# Patient Record
Sex: FEMALE | Race: WHITE | Employment: FULL TIME | URBAN - METROPOLITAN AREA
[De-identification: names, ages, dates, MRNs, and addresses within clinical notes are randomized per-mention and may not be internally consistent; named-entity substitution may affect disease eponyms.]

---

## 2017-02-03 ENCOUNTER — OFFICE VISIT (OUTPATIENT)
Dept: FAMILY MEDICINE | Facility: CLINIC | Age: 37
End: 2017-02-03
Payer: COMMERCIAL

## 2017-02-03 ENCOUNTER — TELEPHONE (OUTPATIENT)
Dept: FAMILY MEDICINE | Facility: CLINIC | Age: 37
End: 2017-02-03

## 2017-02-03 VITALS
BODY MASS INDEX: 36.43 KG/M2 | HEART RATE: 78 BPM | WEIGHT: 246 LBS | OXYGEN SATURATION: 100 % | DIASTOLIC BLOOD PRESSURE: 84 MMHG | SYSTOLIC BLOOD PRESSURE: 124 MMHG | HEIGHT: 69 IN | TEMPERATURE: 97.6 F

## 2017-02-03 DIAGNOSIS — J04.0 LARYNGITIS: ICD-10-CM

## 2017-02-03 DIAGNOSIS — J20.9 ACUTE BRONCHITIS, UNSPECIFIED ORGANISM: Primary | ICD-10-CM

## 2017-02-03 PROCEDURE — 99213 OFFICE O/P EST LOW 20 MIN: CPT | Performed by: PHYSICIAN ASSISTANT

## 2017-02-03 RX ORDER — ALBUTEROL SULFATE 90 UG/1
2 AEROSOL, METERED RESPIRATORY (INHALATION) EVERY 6 HOURS PRN
Qty: 1 INHALER | Refills: 0 | Status: SHIPPED | OUTPATIENT
Start: 2017-02-03

## 2017-02-03 NOTE — NURSING NOTE
"Chief Complaint   Patient presents with     URI       Initial /84 mmHg  Pulse 78  Temp(Src) 97.6  F (36.4  C) (Tympanic)  Ht 5' 8.5\" (1.74 m)  Wt 246 lb (111.585 kg)  BMI 36.86 kg/m2  SpO2 100%  LMP 05/25/2016 (Approximate)  Breastfeeding? No Estimated body mass index is 36.86 kg/(m^2) as calculated from the following:    Height as of this encounter: 5' 8.5\" (1.74 m).    Weight as of this encounter: 246 lb (111.585 kg).  BP completed using cuff size: conrad Moore MA      "

## 2017-02-03 NOTE — TELEPHONE ENCOUNTER
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     Has cough, phlegm with streaks of blood, very dry home, small amount and thick, slight nasal drainage, feels wheezy    No shortness of breath or fever.    Has been keeping well hydrated, taking Tylenol for sore throat pain, Robitussin for cough, herbal tea     Does not feel that she is getting better and concerned since she is pregnant.      Given option of contacting PCP office or coming for OV here, opted for OV here for evaluation.     Scheduled for OV with Carlo at 11:00.    RAAD Roberson

## 2017-02-03 NOTE — Clinical Note
Please abstract the following data from this visit with this patient into the appropriate field in Epic:  Pap smear done on this date: 08/01/2016 (approximately), by this group: [facility], results were normal.

## 2017-02-03 NOTE — PROGRESS NOTES
SUBJECTIVE:                                                    Linette Paul is a 36 year old female who presents to clinic today for the following health issues:    Patient is 36 weeks pregnant, ROLANDO 3/1/17.     Acute Illness   Acute illness concerns: URI  Onset: 1 week     Fever: no    Chills/Sweats: no    Headache (location?): YES    Sinus Pressure:YES    Conjunctivitis:  YES: both    Ear Pain: YES: both    Rhinorrhea: no     Congestion: YES    Sore Throat: YES- was tested for strep 2 days ago at [facility] and came back negative     Cough: YES-productive of yellow sputum with blood streaks    Wheeze: YES    Decreased Appetite: YES    Nausea: no    Vomiting: no    Diarrhea:  no    Dysuria/Freq.: no    Fatigue/Achiness: YES    Sick/Strep Exposure: YES- her 6 year old step son is coughing.      Therapies Tried and outcome: tylenol and robitussin, drinking tea.     was seen 2 days ago at [facility] and she was told this was viral, but now she is experiencing voice hoarsness, sore throat and continued cough that is blood streaked.  No f/v, n/v/d        Problem list and histories reviewed & adjusted, as indicated.  Additional history: as documented    Patient Active Problem List   Diagnosis     Mild intermittent asthma     Left shoulder strain     CARDIOVASCULAR SCREENING; LDL GOAL LESS THAN 160     Normal labor     Active labor     Single liveborn infant delivered vaginally     Past Surgical History   Procedure Laterality Date     C oral surgery procedure       Dupuyer teeth surgery       Social History   Substance Use Topics     Smoking status: Never Smoker      Smokeless tobacco: Not on file     Alcohol Use: No     Family History   Problem Relation Age of Onset     C.A.D. Paternal Grandfather      Hypertension Brother      Hypertension Mother      Hypertension Father      Hypertension Paternal Grandmother      Hypertension Paternal Grandfather      CEREBROVASCULAR DISEASE Paternal Grandfather      Breast Cancer Maternal  "Aunt      Breast Cancer Maternal Aunt          Current Outpatient Prescriptions   Medication Sig Dispense Refill     albuterol (PROAIR HFA/PROVENTIL HFA/VENTOLIN HFA) 108 (90 BASE) MCG/ACT Inhaler Inhale 2 puffs into the lungs every 6 hours as needed for shortness of breath / dyspnea or wheezing 1 Inhaler 0     lidocaine (XYLOCAINE) 2 % solution Take 15 mLs by mouth every 6 hours as needed for moderate pain swish and spit every 3-8 hours as needed; max 8 doses/24 hour period 100 mL 0     Prenatal vitamin  s (DIS) TABS Take 1 tablet by mouth daily       Aspirin-Acetaminophen-Caffeine (EXCEDRIN PO)        acetaminophen (TYLENOL) 325 MG tablet Take 2 tablets (650 mg) by mouth every 4 hours as needed (temperature > 100.4 F) 100 tablet      ibuprofen (ADVIL,MOTRIN) 400-800 mg tablet Take 1-2 tablets (400-800 mg) by mouth every 6 hours as needed (cramping) 60 tablet 0     docusate sodium 100 MG tablet Take 100 mg by mouth daily as needed for constipation 60 tablet 1     Allergies   Allergen Reactions     Flagyl [Metronidazole]      No Known Drug Allergies      Seasonal Allergies        ROS:  Constitutional, HEENT, cardiovascular, pulmonary, gi and gu systems are negative, except as otherwise noted.    OBJECTIVE:                                                    /84 mmHg  Pulse 78  Temp(Src) 97.6  F (36.4  C) (Tympanic)  Ht 5' 8.5\" (1.74 m)  Wt 246 lb (111.585 kg)  BMI 36.86 kg/m2  SpO2 100%  LMP 05/25/2016 (Approximate)  Breastfeeding? No  Body mass index is 36.86 kg/(m^2).  GENERAL: healthy, alert and no distress, voice is hoarse  EYES: Eyes grossly normal to inspection  HENT: ear canals and TM's normal, nose and mouth without ulcers or lesions  NECK: no adenopathy  RESP: lungs clear to auscultation - no rales, rhonchi or wheezes  CV: regular rate and rhythm, normal S1 S2, no S3 or S4, no murmur  Diagnostic Test Results:  none      ASSESSMENT/PLAN:                                                      1. " Laryngitis  Advised that laryngitis is typically a viral infection, small amount of blood streaked sputum likely secondary to inflammation/irritation of airways.  Advise that she use albuterol for cough and wheezing, given degree of sore throat, she is asking for something for throat pain.  She may use lidocaine rinse sparingly and has been advised not to swallow this. She is agreeable, she will return if new or worsening symptoms.  - albuterol (PROAIR HFA/PROVENTIL HFA/VENTOLIN HFA) 108 (90 BASE) MCG/ACT Inhaler; Inhale 2 puffs into the lungs every 6 hours as needed for shortness of breath / dyspnea or wheezing  Dispense: 1 Inhaler; Refill: 0  - lidocaine (XYLOCAINE) 2 % solution; Take 15 mLs by mouth every 6 hours as needed for moderate pain swish and spit every 3-8 hours as needed; max 8 doses/24 hour period  Dispense: 100 mL; Refill: 0    2. Acute bronchitis, unspecified organism  - albuterol (PROAIR HFA/PROVENTIL HFA/VENTOLIN HFA) 108 (90 BASE) MCG/ACT Inhaler; Inhale 2 puffs into the lungs every 6 hours as needed for shortness of breath / dyspnea or wheezing  Dispense: 1 Inhaler; Refill: 0    See Patient Instructions    Carlo Pozo PA-C  [facility] [place]

## 2017-02-04 ASSESSMENT — ASTHMA QUESTIONNAIRES: ACT_TOTALSCORE: 22
